# Patient Record
Sex: FEMALE | Race: WHITE | NOT HISPANIC OR LATINO | Employment: OTHER | RURAL
[De-identification: names, ages, dates, MRNs, and addresses within clinical notes are randomized per-mention and may not be internally consistent; named-entity substitution may affect disease eponyms.]

---

## 2023-07-05 ENCOUNTER — OFFICE VISIT (OUTPATIENT)
Dept: FAMILY MEDICINE | Facility: CLINIC | Age: 53
End: 2023-07-05

## 2023-07-05 VITALS
SYSTOLIC BLOOD PRESSURE: 127 MMHG | OXYGEN SATURATION: 93 % | HEIGHT: 61 IN | DIASTOLIC BLOOD PRESSURE: 80 MMHG | BODY MASS INDEX: 22.47 KG/M2 | HEART RATE: 102 BPM | RESPIRATION RATE: 18 BRPM | TEMPERATURE: 98 F | WEIGHT: 119 LBS

## 2023-07-05 DIAGNOSIS — J18.9 COMMUNITY ACQUIRED PNEUMONIA, UNSPECIFIED LATERALITY: ICD-10-CM

## 2023-07-05 DIAGNOSIS — R06.02 SHORTNESS OF BREATH: Primary | ICD-10-CM

## 2023-07-05 DIAGNOSIS — J44.1 COPD EXACERBATION: ICD-10-CM

## 2023-07-05 DIAGNOSIS — R05.9 COUGH, UNSPECIFIED TYPE: ICD-10-CM

## 2023-07-05 LAB
CTP QC/QA: YES
FLUAV AG NPH QL: NEGATIVE
FLUBV AG NPH QL: NEGATIVE
SARS-COV-2 AG RESP QL IA.RAPID: NEGATIVE

## 2023-07-05 PROCEDURE — 87428 POCT SARS-COV2 (COVID) WITH FLU ANTIGEN: ICD-10-PCS | Mod: QW,,, | Performed by: STUDENT IN AN ORGANIZED HEALTH CARE EDUCATION/TRAINING PROGRAM

## 2023-07-05 PROCEDURE — 87428 SARSCOV & INF VIR A&B AG IA: CPT | Mod: QW,,, | Performed by: STUDENT IN AN ORGANIZED HEALTH CARE EDUCATION/TRAINING PROGRAM

## 2023-07-05 PROCEDURE — 94640 PR INHAL RX, AIRWAY OBST/DX SPUTUM INDUCT: ICD-10-PCS | Mod: ,,, | Performed by: STUDENT IN AN ORGANIZED HEALTH CARE EDUCATION/TRAINING PROGRAM

## 2023-07-05 PROCEDURE — 99204 OFFICE O/P NEW MOD 45 MIN: CPT | Mod: ,,, | Performed by: STUDENT IN AN ORGANIZED HEALTH CARE EDUCATION/TRAINING PROGRAM

## 2023-07-05 PROCEDURE — 94640 AIRWAY INHALATION TREATMENT: CPT | Mod: ,,, | Performed by: STUDENT IN AN ORGANIZED HEALTH CARE EDUCATION/TRAINING PROGRAM

## 2023-07-05 PROCEDURE — 99204 PR OFFICE/OUTPT VISIT, NEW, LEVL IV, 45-59 MIN: ICD-10-PCS | Mod: ,,, | Performed by: STUDENT IN AN ORGANIZED HEALTH CARE EDUCATION/TRAINING PROGRAM

## 2023-07-05 RX ORDER — AMOXICILLIN AND CLAVULANATE POTASSIUM 875; 125 MG/1; MG/1
1 TABLET, FILM COATED ORAL EVERY 12 HOURS
Qty: 10 TABLET | Refills: 0 | Status: SHIPPED | OUTPATIENT
Start: 2023-07-05 | End: 2023-07-10

## 2023-07-05 RX ORDER — IPRATROPIUM BROMIDE AND ALBUTEROL SULFATE 2.5; .5 MG/3ML; MG/3ML
3 SOLUTION RESPIRATORY (INHALATION) EVERY 6 HOURS PRN
Qty: 75 ML | Refills: 0 | Status: CANCELLED | OUTPATIENT
Start: 2023-07-05 | End: 2024-07-04

## 2023-07-05 RX ORDER — IPRATROPIUM BROMIDE AND ALBUTEROL SULFATE 2.5; .5 MG/3ML; MG/3ML
3 SOLUTION RESPIRATORY (INHALATION)
Status: COMPLETED | OUTPATIENT
Start: 2023-07-05 | End: 2023-07-05

## 2023-07-05 RX ORDER — AZITHROMYCIN 250 MG/1
TABLET, FILM COATED ORAL
Qty: 6 TABLET | Refills: 0 | Status: SHIPPED | OUTPATIENT
Start: 2023-07-05 | End: 2023-07-10

## 2023-07-05 RX ORDER — BENZONATATE 200 MG/1
200 CAPSULE ORAL 3 TIMES DAILY PRN
Qty: 30 CAPSULE | Refills: 0 | Status: SHIPPED | OUTPATIENT
Start: 2023-07-05 | End: 2023-07-15

## 2023-07-05 RX ORDER — PREDNISONE 20 MG/1
40 TABLET ORAL DAILY
Qty: 10 TABLET | Refills: 0 | Status: SHIPPED | OUTPATIENT
Start: 2023-07-05 | End: 2023-07-10

## 2023-07-05 RX ORDER — ALBUTEROL SULFATE 90 UG/1
2 AEROSOL, METERED RESPIRATORY (INHALATION) EVERY 4 HOURS PRN
Qty: 18 G | Refills: 0 | Status: SHIPPED | OUTPATIENT
Start: 2023-07-05 | End: 2023-09-27 | Stop reason: SDUPTHER

## 2023-07-05 RX ORDER — GUAIFENESIN AND DEXTROMETHORPHAN HYDROBROMIDE 1200; 60 MG/1; MG/1
1 TABLET, EXTENDED RELEASE ORAL 2 TIMES DAILY PRN
Qty: 20 TABLET | Refills: 0 | Status: SHIPPED | OUTPATIENT
Start: 2023-07-05 | End: 2023-07-15

## 2023-07-05 RX ADMIN — IPRATROPIUM BROMIDE AND ALBUTEROL SULFATE 3 ML: 2.5; .5 SOLUTION RESPIRATORY (INHALATION) at 04:07

## 2023-07-05 NOTE — PROGRESS NOTES
"Subjective     Patient ID: Rubi Neely is a 53 y.o. female.    Chief Complaint: Shortness of Breath and Cough (States she has been sick since July 1. States she has taken otc meds which are not helping. Reports symptoms are worsening. )    HPI    53-year-old woman here with worsening cough and shortness of breath over the last week. OTC medications have not been helpful. She denies any sick contacts. She is a current smoker but has not been smoking very much due to shortness of breath. She has lost her voice. Appears somewhat uncomfortable.     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     Objective     /80 (BP Location: Left arm, Patient Position: Sitting, BP Method: Medium (Automatic))   Pulse 110   Temp 98 °F (36.7 °C) (Oral)   Resp 18   Ht 5' 1" (1.549 m)   Wt 54 kg (119 lb)   SpO2 (!) 92%   BMI 22.48 kg/m²       Physical Exam    Gen: well-groomed, well-dressed. Uncomfortable appearing and in mild repsiratory distress  HEENT:  NCAT, symmetric  Pulm: increased work of breathing, no accessory muscle use; unable to speak complete sentences without having to stop  Neuro: CN II-XII grossly normal   Psych: pleasant affect, congruent mood. Linear thought; good eye contact  SKIN: no rashes present on face, neck, hands      Imaging:   CXR not showing in focal consolidation or infiltrate but lungs do appear hyperinflated c/w COPD     Assessment and Plan     Problem List Items Addressed This Visit    None  Visit Diagnoses       Shortness of breath    -  Primary    Relevant Medications    albuterol (VENTOLIN HFA) 90 mcg/actuation inhaler    Other Relevant Orders    POCT SARS-COV2 (COVID) with Flu Antigen (Completed)    X-Ray Chest PA And Lateral    Cough, unspecified type        Relevant Medications    dextromethorphan-guaiFENesin 60-1,200 mg per 12 hr tablet    benzonatate (TESSALON) 200 MG capsule    Other Relevant Orders    X-Ray " Chest PA And Lateral    Community acquired pneumonia, unspecified laterality        Relevant Medications    amoxicillin-clavulanate 875-125mg (AUGMENTIN) 875-125 mg per tablet    azithromycin (Z-HOLLIE) 250 MG tablet          Covid/flu negative  CXR not showing in focal consolidation or infiltrate but lungs do appear hyperinflated c/w COPD    Shortness of breath with SpO2 in the low 90's. No prior history of COPD but is a daily smoker. She had good response to duoneb with better air movement and cough was easier. She really does not want to go to the hospital, although given her degree of respiratory distress I do have some concerns about her going home. Will treat empirically for COPD exacerbation with 5 days of prendisone, albuterol nebs q4h prn, and cover with abx for empiric treatment of CAP. Encouraged her to have a very LOW threshold for returning to ED for respiratory/ventilatory support if her breathing worsens or does not rapidly improve. She expressed understanding that she could become seriously ill, rapidly, if she does receive adequate treatment; however at this time, she would prefer to return home.     Face to face encounter time 30 minutes.    Non face to face encounter time 15 minutes.  Reviewing separate obtained history, counseling and educating the patient, family and/or other caregivers, ordering medications, tests or procedures; referring and communicating with other health care professionals; documenting clinical information in the electronic medical record; care coordination; independently interpreting results and communicating results to the patient, family, and/or caregivers.    Total time for visit  45 minutes

## 2023-09-27 DIAGNOSIS — R06.02 SHORTNESS OF BREATH: ICD-10-CM

## 2023-09-27 RX ORDER — ALBUTEROL SULFATE 90 UG/1
2 AEROSOL, METERED RESPIRATORY (INHALATION) EVERY 4 HOURS PRN
Qty: 18 G | Refills: 0 | Status: SHIPPED | OUTPATIENT
Start: 2023-09-27

## 2024-04-16 ENCOUNTER — OFFICE VISIT (OUTPATIENT)
Dept: FAMILY MEDICINE | Facility: CLINIC | Age: 54
End: 2024-04-16

## 2024-04-16 VITALS
DIASTOLIC BLOOD PRESSURE: 85 MMHG | TEMPERATURE: 98 F | OXYGEN SATURATION: 94 % | WEIGHT: 120.19 LBS | SYSTOLIC BLOOD PRESSURE: 130 MMHG | HEART RATE: 91 BPM | HEIGHT: 61 IN | BODY MASS INDEX: 22.69 KG/M2

## 2024-04-16 DIAGNOSIS — R06.02 SHORTNESS OF BREATH: ICD-10-CM

## 2024-04-16 DIAGNOSIS — J44.1 COPD EXACERBATION: Primary | ICD-10-CM

## 2024-04-16 PROCEDURE — 99213 OFFICE O/P EST LOW 20 MIN: CPT | Mod: ,,, | Performed by: STUDENT IN AN ORGANIZED HEALTH CARE EDUCATION/TRAINING PROGRAM

## 2024-04-16 RX ORDER — ALBUTEROL SULFATE 90 UG/1
2 AEROSOL, METERED RESPIRATORY (INHALATION) EVERY 4 HOURS PRN
Qty: 18 G | Refills: 0 | Status: SHIPPED | OUTPATIENT
Start: 2024-04-16

## 2024-04-16 RX ORDER — AZITHROMYCIN 250 MG/1
TABLET, FILM COATED ORAL
Qty: 6 TABLET | Refills: 0 | Status: SHIPPED | OUTPATIENT
Start: 2024-04-16 | End: 2024-04-21

## 2024-04-16 RX ORDER — PREDNISONE 20 MG/1
40 TABLET ORAL DAILY
Qty: 10 TABLET | Refills: 0 | Status: SHIPPED | OUTPATIENT
Start: 2024-04-16 | End: 2024-04-21

## 2024-04-16 NOTE — PROGRESS NOTES
"Subjective     Patient ID: Rubi Neely is a 53 y.o. female.    Chief Complaint: Cough, Shortness of Breath (Symptoms started about a month ago; also, tires very easily), and stomach ulcers (Has taken otc omeprazole and tagamet but neither gives much relief)    HPI    Rubi Neely is a 53 y.o. patient with the symptoms listed above. I have not seen her in nearly a year. Is not taking any controller medication for his asthma. Does endorse some dyspnea and a productive cough. No fever or chills.        Objective   /85   Pulse 91   Temp 97.9 °F (36.6 °C)   Ht 5' 1" (1.549 m)   Wt 54.5 kg (120 lb 3.2 oz)   SpO2 (!) 94%   BMI 22.71 kg/m²     Physical Exam    Gen: well-groomed, well-dressed. No apparent distress  HEENT:  NCAT, symmetric  Pulm: normal work of breathing, no accessory muscle use; catches her breath in between sentences. Normal respiratory rate. No wheezes heard bilaterally.   Neuro: CN II-XII grossly normal   Psych: pleasant affect, congruent mood. Linear thought; good eye contact  SKIN: no rashes present on face, neck, hands          Assessment and Plan     Problem List Items Addressed This Visit    None  Visit Diagnoses       COPD exacerbation    -  Primary    Relevant Medications    predniSONE (DELTASONE) 20 MG tablet    azithromycin (Z-HOLLIE) 250 MG tablet    Shortness of breath        Relevant Medications    albuterol (VENTOLIN HFA) 90 mcg/actuation inhaler            She is somewhat short of breath and feels tired, but work of breathing and respiratory rate are not increased. However she does look somewhat uncomfortable. HR normal and SpO2 wnl at 94%. Breathing improved substantially after nebulizer treatment. We offered to Rx this for her at home but she cannot afford it at this time. Will recommend 1-2 puffs of albuterol inhaler q4h prn for wheezing or shortness breath, starting a 5-day course of prednisone, and azithromycin. Strongly encouraged her to go to ED or notify us  if " symptoms worsen or fail to improve over the next 24 - 48h and she expressed understanding and agreement with this plan.

## 2025-07-25 ENCOUNTER — OFFICE VISIT (OUTPATIENT)
Dept: FAMILY MEDICINE | Facility: CLINIC | Age: 55
End: 2025-07-25

## 2025-07-25 VITALS
SYSTOLIC BLOOD PRESSURE: 132 MMHG | OXYGEN SATURATION: 97 % | DIASTOLIC BLOOD PRESSURE: 82 MMHG | HEART RATE: 82 BPM | TEMPERATURE: 98 F | WEIGHT: 123.81 LBS | HEIGHT: 61 IN | BODY MASS INDEX: 23.38 KG/M2

## 2025-07-25 DIAGNOSIS — Z09 HOSPITAL DISCHARGE FOLLOW-UP: Primary | ICD-10-CM

## 2025-07-25 DIAGNOSIS — I25.2 HX OF ST ELEVATION MYOCARDIAL INFARCTION: ICD-10-CM

## 2025-07-25 PROCEDURE — 99213 OFFICE O/P EST LOW 20 MIN: CPT | Mod: ,,, | Performed by: NURSE PRACTITIONER

## 2025-07-25 RX ORDER — LISINOPRIL 5 MG/1
5 TABLET ORAL EVERY MORNING
COMMUNITY
Start: 2025-07-22

## 2025-07-25 RX ORDER — TICAGRELOR 90 MG/1
90 TABLET, FILM COATED ORAL 2 TIMES DAILY
COMMUNITY
Start: 2025-07-21

## 2025-07-25 RX ORDER — ASPIRIN 81 MG/1
81 TABLET ORAL EVERY MORNING
COMMUNITY
Start: 2025-07-22 | End: 2026-07-22

## 2025-07-25 RX ORDER — ATORVASTATIN CALCIUM 40 MG/1
40 TABLET, FILM COATED ORAL NIGHTLY
COMMUNITY
Start: 2025-07-21

## 2025-07-25 RX ORDER — FUROSEMIDE 40 MG/1
20 TABLET ORAL DAILY
COMMUNITY
Start: 2025-07-21

## 2025-07-25 NOTE — PROGRESS NOTES
EZEQUIEL Hung   [address] [place] [address]     PATIENT NAME: Rubi Neely  : 1970  DATE: 25  MRN: [id number]      Billing Provider: EZEQUIEL Hung  Level of Service:   Patient PCP Information       Provider PCP Type    Paresh Wall MD General            Reason for Visit / Chief Complaint: hospital followup  ([facility] - post [place])       Update PCP  Update Chief Complaint         History of Present Illness / Problem Focused Workflow     Rubi Neely presents to the clinic with hospital followup  ([facility] - post [place])     HPI  Hospital follow up of DC post admission for STEMI. Went into V-fib 2 times and defibrillated once and intubated.   Cath result -100% occlusion of the left circumflex after the small OM branch in the midportion of the vessel. States feeling well other than arms feel tired and sore. Multiple bruises to arms from IV and other during her hospital stay. Has appointment with cardiology  Nikki Walker in [place]. Denies any CP or SOB. States had been helping a friend clean her house when the pain started and continued for 2 hours before she called 911.    Review of Systems     Review of Systems   Integumentary:  Positive for color change (,ultiple bruises to forearms from Testing and treatment at hospital. She does have also bruising to medial left).   Neurological:  Positive for weakness (arms feel heavy since inside hospital).        Medical / Social / Family History     Past Medical History:   Diagnosis Date    Myocardial infarction 2025       Past Surgical History:   Procedure Laterality Date    HYSTERECTOMY         Social History  Ms.  reports that she has been smoking cigarettes. She has a 7.5 pack-year smoking history. She has never been exposed to tobacco smoke. She has never used smokeless tobacco. She reports current alcohol use. She reports that she does not use drugs.    Family History  Ms.'s family history  "includes Cancer in her mother.    Medications and Allergies     Medications  Outpatient Medications Marked as Taking for the 7/25/25 encounter (Office Visit) with Bobby Rosales FNP-C   Medication Sig Dispense Refill    albuterol (VENTOLIN HFA) 90 mcg/actuation inhaler Inhale 2 puffs into the lungs every 4 (four) hours as needed for Shortness of Breath. Rescue 18 g 0    aspirin (ECOTRIN) 81 MG EC tablet Take 81 mg by mouth every morning.      atorvastatin (LIPITOR) 40 MG tablet Take 40 mg by mouth every evening.      furosemide (LASIX) 40 MG tablet Take 20 mg by mouth once daily.      lisinopriL (PRINIVIL,ZESTRIL) 5 MG tablet Take 5 mg by mouth every morning.      ticagrelor (BRILINTA) 90 mg tablet Take 90 mg by mouth 2 (two) times daily.         Allergies  Review of patient's allergies indicates:   Allergen Reactions    Wasp venom     Egg derived Nausea And Vomiting       Physical Examination   /82   Pulse 82   Temp 97.6 °F (36.4 °C)   Ht 5' 1" (1.549 m)   Wt 56.2 kg (123 lb 12.8 oz)   SpO2 97%   BMI 23.39 kg/m²    Physical Exam  Constitutional:       General: She is not in acute distress.     Appearance: Normal appearance. She is not ill-appearing.   HENT:      Head: Normocephalic.      Mouth/Throat:      Mouth: Mucous membranes are moist.   Eyes:      Pupils: Pupils are equal, round, and reactive to light.   Cardiovascular:      Rate and Rhythm: Normal rate.      Pulses: Normal pulses.      Heart sounds: Normal heart sounds.   Pulmonary:      Effort: Pulmonary effort is normal.      Breath sounds: Normal breath sounds.   Musculoskeletal:         General: Normal range of motion.   Skin:     General: Skin is warm and dry.      Capillary Refill: Capillary refill takes less than 2 seconds.      Findings: Bruising present.   Neurological:      Mental Status: She is alert and oriented to person, place, and time.   Psychiatric:         Mood and Affect: Mood normal.         Behavior: Behavior " normal.          Assessment and Plan (including Health Maintenance)      Problem List  Smart Sets  Document Outside HM   :    Plan: Continue treatment as previous and set by Cardiologist. Will follow up with cardiology as scheduled.         Health Maintenance Due   Topic Date Due    Hepatitis C Screening  Never done    Lipid Panel  Never done    HIV Screening  Never done    TETANUS VACCINE  Never done    Mammogram  Never done    Pneumococcal Vaccines (Age 50+) (1 of 2 - PCV) Never done    Colorectal Cancer Screening  Never done    Shingles Vaccine (1 of 2) Never done    COVID-19 Vaccine (1 - 2024-25 season) Never done       Problem List Items Addressed This Visit    None      Health Maintenance Topics with due status: Not Due       Topic Last Completion Date    Aspirin/Antiplatelet Therapy 07/25/2025    Influenza Vaccine Not Due    RSV Vaccine (Age 60+ and Pregnant patients) Not Due       No future appointments.         Signature:  ANGELA Hung-C      [address]             [place] [address]    Date of encounter: 7/25/25

## 2025-07-25 NOTE — PATIENT INSTRUCTIONS
Continue to follow up with Cardiology as scheduled    If you have any problems or concerns contact the clinic for appointment.     If have any acute CP or SOB call 911